# Patient Record
Sex: MALE | Race: WHITE
[De-identification: names, ages, dates, MRNs, and addresses within clinical notes are randomized per-mention and may not be internally consistent; named-entity substitution may affect disease eponyms.]

---

## 2015-11-11 VITALS — SYSTOLIC BLOOD PRESSURE: 118 MMHG | DIASTOLIC BLOOD PRESSURE: 73 MMHG

## 2021-10-21 ENCOUNTER — HOSPITAL ENCOUNTER (OUTPATIENT)
Dept: HOSPITAL 61 - KCIC | Age: 61
End: 2021-10-21
Attending: PHYSICIAN ASSISTANT
Payer: COMMERCIAL

## 2021-10-21 DIAGNOSIS — M17.12: Primary | ICD-10-CM

## 2021-10-21 DIAGNOSIS — M25.862: ICD-10-CM

## 2021-10-21 DIAGNOSIS — M25.462: ICD-10-CM

## 2021-10-21 DIAGNOSIS — M25.762: ICD-10-CM

## 2021-10-21 PROCEDURE — 73562 X-RAY EXAM OF KNEE 3: CPT

## 2021-10-21 NOTE — [facility]
EXAM: AP, lateral and oblique views of the left knee



DATE: 10/21/2021 3:39 PM



INDICATION: Reason: LT MEDIAL KNEE PAIN 1 WEEK, NKI / Spl. Instructions:  / History:  



COMPARISON: No Prior



FINDINGS:

No acute fracture or dislocation. Small left knee joint effusion.  Mild medial compartment joint spac
e narrowing with small associated osteophytes.



IMPRESSION:

1.  No acute fracture or dislocation.

2.  Left knee joint osteoarthritis.

3.  Small left knee joint effusion.



Electronically signed by: Tomasz Stern MD (10/21/2021 7:03 PM) [facility]